# Patient Record
Sex: MALE | Race: BLACK OR AFRICAN AMERICAN | NOT HISPANIC OR LATINO | Employment: FULL TIME | URBAN - METROPOLITAN AREA
[De-identification: names, ages, dates, MRNs, and addresses within clinical notes are randomized per-mention and may not be internally consistent; named-entity substitution may affect disease eponyms.]

---

## 2018-08-15 ENCOUNTER — HOSPITAL ENCOUNTER (EMERGENCY)
Facility: HOSPITAL | Age: 48
Discharge: HOME | End: 2018-08-15
Attending: EMERGENCY MEDICINE

## 2018-08-15 VITALS
HEIGHT: 76 IN | OXYGEN SATURATION: 99 % | WEIGHT: 275 LBS | SYSTOLIC BLOOD PRESSURE: 146 MMHG | BODY MASS INDEX: 33.49 KG/M2 | TEMPERATURE: 99 F | HEART RATE: 113 BPM | DIASTOLIC BLOOD PRESSURE: 83 MMHG | RESPIRATION RATE: 20 BRPM

## 2018-08-15 DIAGNOSIS — M10.9 ACUTE GOUT INVOLVING TOE OF RIGHT FOOT, UNSPECIFIED CAUSE: Primary | ICD-10-CM

## 2018-08-15 PROCEDURE — 25000003 PHARM REV CODE 250: Performed by: EMERGENCY MEDICINE

## 2018-08-15 PROCEDURE — 99283 EMERGENCY DEPT VISIT LOW MDM: CPT

## 2018-08-15 RX ORDER — OXYCODONE AND ACETAMINOPHEN 5; 325 MG/1; MG/1
1 TABLET ORAL
Status: COMPLETED | OUTPATIENT
Start: 2018-08-15 | End: 2018-08-15

## 2018-08-15 RX ORDER — COLCHICINE 0.6 MG/1
0.6 TABLET ORAL DAILY
Qty: 3 TABLET | Refills: 0 | Status: SHIPPED | OUTPATIENT
Start: 2018-08-15 | End: 2018-08-18

## 2018-08-15 RX ORDER — HYDROCODONE BITARTRATE AND IBUPROFEN 7.5; 2 MG/1; MG/1
1 TABLET, FILM COATED ORAL EVERY 6 HOURS PRN
Qty: 10 TABLET | Refills: 0 | Status: SHIPPED | OUTPATIENT
Start: 2018-08-15

## 2018-08-15 RX ADMIN — OXYCODONE HYDROCHLORIDE AND ACETAMINOPHEN 1 TABLET: 5; 325 TABLET ORAL at 07:08

## 2018-08-15 NOTE — ED PROVIDER NOTES
Encounter Date: 8/15/2018       History     Chief Complaint   Patient presents with    Gout     to right knee x 1 week. pt on medicaiton for it but states its increasing. also reports a rash to right side of neck since starting the medicaiton (pt does not know name of medication at this time)     Triage note appreciate patient complains of right toe pain not right knee pain      The history is provided by the patient.   Foot Injury    The incident occurred at home. There was no injury mechanism. The incident occurred yesterday. The pain is present in the right toes (HALLUX). The quality of the pain is described as aching. The pain has been constant since onset. Pertinent negatives include no numbness, no inability to bear weight and no tingling. He reports no foreign bodies present. The symptoms are aggravated by bearing weight. Treatments tried: UNKNOWN MEDICATION NAME.     Review of patient's allergies indicates:  No Known Allergies  Past Medical History:   Diagnosis Date    Hypertension      Past Surgical History:   Procedure Laterality Date    ORTHOPEDIC SURGERY       History reviewed. No pertinent family history.  Social History     Tobacco Use    Smoking status: Never Smoker   Substance Use Topics    Alcohol use: Yes    Drug use: Not on file     Review of Systems   Constitutional: Negative for fever.   Genitourinary: Negative for dysuria.   Skin: Positive for rash.        DENIES RASH COMPLAINING OF ITCHINESS TO NECK   Neurological: Negative for tingling and numbness.   All other systems reviewed and are negative.      Physical Exam     Initial Vitals [08/15/18 0709]   BP Pulse Resp Temp SpO2   (!) 146/83 (!) 113 20 98.5 °F (36.9 °C) 99 %      MAP       --         Physical Exam    Vitals reviewed.  Constitutional: He appears well-developed and well-nourished. No distress.   HENT:   Head: Atraumatic.   Eyes: EOM are normal.   Neck: Neck supple. No JVD present.   NO MENINGEAL SIGNS OR  GROSS RASH    Cardiovascular:   HEART RATE APPROPRIATE FOR PAIN DORSALIS PEDIS EQUAL   Pulmonary/Chest: No respiratory distress.   Musculoskeletal: Normal range of motion.   NONTENDER CALVES  RIGHT HALLUX TENDER WITHOUT SURROUNDING ERYTHEMA.  NONTENDER MALLEOLI OR DORSUM.  CHRONIC FUNGUS TOENAILS PER PATIENT   Neurological: He is alert and oriented to person, place, and time.   Skin: Skin is warm and dry.   Psychiatric: He has a normal mood and affect.         ED Course   Procedures  Labs Reviewed - No data to display       Imaging Results    None          Medical Decision Making:   ED Management:  NO SIGNS OF SEPTIC JOINT CELLULITIS OR DVT                      Clinical Impression:    GOUT HALLUX RIGHT      Disposition:   Disposition: Discharged  Condition: Stable                        Dipak Womack,   08/15/18 0728

## 2018-08-15 NOTE — ED TRIAGE NOTES
Pt complaining of swelling to right foot that began last week. Pt takes medicine for gout, pt states he does not take prescribed medication daily.